# Patient Record
(demographics unavailable — no encounter records)

---

## 2019-07-28 NOTE — RAD
Left knee 3 views.

 

HISTORY: Fall

 

3 views were taken of the left knee. There is not evidence of an acute 

fracture. There is no joint effusion. There is osteoarthritis. There is 

mild joint space narrowing medially. There is hypertrophic spurring in all

3 compartments.

 

IMPRESSION:

1. Osteoarthritis left knee.

2. No acute fracture.

 

Electronically signed by: Nolan Maguire MD (7/28/2019 7:45 AM) [facility]

## 2019-07-28 NOTE — RAD
Right ankle 3 views, right foot 3 views.

 

HISTORY: Fall

 

Right ankle

 

3 views were taken of the right ankle. There is mild soft tissue swelling.

There is no acute fracture.

 

Right foot

 

3 views were taken of the right foot. There is not evidence of an acute 

fracture or osseous abnormality.

 

IMPRESSION:

1. No acute fracture noted in the right foot.

2. No acute fracture noted in the right ankle.

 

Electronically signed by: Nolan Maguire MD (7/28/2019 8:01 AM) [facility]

## 2019-07-28 NOTE — RAD
EXAM: CT HEAD WITHOUT IV CONTRAST

 

CLINICAL HISTORY: Fall

 

COMPARISON: None.

 

TECHNIQUE:

Routine CT of the head without contrast. Soft tissues and bone windows 

were reviewed.

 

PQRS compliance statement - One or more of the following individualized 

dose reduction techniques were utilized for this study:

1.  Automated exposure control

2.  Adjustment of the mA and/or kV according to patient size

3.  Use of iterative reconstruction technique

 

FINDINGS:  

There is no evidence of hemorrhage, mass or extra-axial fluid collection.

 

Grey-white differentiation is maintained with no evidence of edema. 

 

There is no mass effect or shift of the intracranial structures.

 

The ventricles, basilar cisterns and cortical sulci are normal in size and

configuration for the patients stated age.

 

The cerebellum and brainstem are unremarkable.  

 

The calvarium demonstrates no evidence of fracture or focal lesion.

 

There is normal aeration of the visualized paranasal sinuses and mastoid 

air cells.

 

The visualized portions of the orbits are normal.

 

 

 

IMPRESSION:

No evidence for acute intracranial process

___________________________________

EXAM: CT CERVICAL SPINE WITHOUT IV CONTRAST

 

CLINICAL HISTORY: Fall

 

COMPARISON: None available.

 

TECHNIQUE: Helical CT of the cervical spine was performed. Axial, coronal 

and sagittal reformatted images were also performed.

 

PQRS compliance statement - One or more of the following individualized 

dose reduction techniques were utilized for this study:

1.  Automated exposure control

2.  Adjustment of the mA and/or kV according to patient size

3.  Use of iterative reconstruction technique

 

FINDINGS: 

 

Vertebral body heights are preserved. Disc heights are grossly preserved. 

No spondylolisthesis. Straightening of the normal cervical lordosis. No 

evidence for acute fracture. Mild atlantodental degenerative changes are 

seen.

 

IMPRESSION:

No evidence for acute fracture or subluxation.

 

Electronically signed by: Carson Durant MD (7/28/2019 6:27 AM) [facility]

## 2019-07-28 NOTE — RAD
Right ankle 3 views, right foot 3 views.

 

HISTORY: Fall

 

Right ankle

 

3 views were taken of the right ankle. There is mild soft tissue swelling.

There is no acute fracture.

 

Right foot

 

3 views were taken of the right foot. There is not evidence of an acute 

fracture or osseous abnormality.

 

IMPRESSION:

1. No acute fracture noted in the right foot.

2. No acute fracture noted in the right ankle.

 

Electronically signed by: Nolan Maguire MD (7/28/2019 8:01 AM) [facility]

## 2019-08-31 NOTE — PHYS DOC
Past Medical History


Past Medical History:  Diabetes-Type II


Additional Past Medical Histor:  DIABETIC RIGHT FOOT ULCER


 (ANGÉLICA PEREZ)


Past Surgical History:  Other


Additional Past Surgical Histo:  RIGHT FOOT


 (ANGÉLICA PEREZ)


Alcohol Use:  Occasionally


Drug Use:  None


 (ANGÉLICA PEREZ)





Adult General


Chief Complaint


Chief Complaint:  ABSCESS





HPI


HPI





Patient is a 40  year old [male] who presents with [abscess to left hip. Patient

 reports he has noticed this over the past 4 days. Reports today he had put some

 more warm water on it and had "gotten some pus out of it. Reports he is a 

diabetic, reports his blood sugar has been little bit high today. States he has 

not eaten very much that his blood sugar has been running high. Denies any 

fever, denies any other complaints. reports he has never had one of these 

before.  ]


 (ANGÉLICA PEREZ)





Review of Systems


Review of Systems





Constitutional: Denies fever or chills []








GI: Denies abdominal pain, nausea, vomiting, bloody stools or diarrhea []


: Denies dysuria or hematuria []


Musculoskeletal: Denies back pain or joint pain []


Integument: Denies rash or skin lesions other than firm lesion to left hip with 

some purulent drainage []


Neurologic: Denies headache, focal weakness or sensory changes []


Endocrine: Denies polyuria or polydipsia []





All other systems were reviewed and found to be within normal limits, except as 

documented in this note.


 (ANGÉLICA PEREZ)





Allergies


Allergies





Allergies








Coded Allergies Type Severity Reaction Last Updated Verified


 


  piperacillin Allergy Intermediate  7/28/19 Yes


 


  tazobactam Allergy Intermediate  4/14/18 Yes





 (CHALINO SOTELO DO)





Physical Exam


Physical Exam





Constitutional: Well developed, well nourished, no acute distress, non-toxic 

appearance. []





Abdomen: Bowel sounds normal, soft, no tenderness, no masses, no pulsatile 

masses. [] 


Skin: Warm, dry, no erythema, no rash. Left hip noted approximately 4 cm x 7 cm 

firm abscess to left hip, one small area with minimal purulent drainage, 

proximally 1/2 milliliter. No fluctuance noted, abscess noted firm. Area warm[] 


Back: No tenderness, no CVA tenderness. [] 


Extremities: No tenderness, no cyanosis, no clubbing, ROM intact, no edema. [] 


Neurologic: Alert and oriented X 3, normal motor function, normal sensory 

function, no focal deficits noted. []


Psychologic: Affect normal, judgement normal, mood normal. []


 (ANGÉLICA PEREZ)





Current Patient Data


Vital Signs





                                   Vital Signs








  Date Time  Temp Pulse Resp B/P (MAP) Pulse Ox O2 Delivery O2 Flow Rate FiO2


 


8/31/19 21:00 98.5 122 14 166/106 (126) 96 Room Air  





 98.5       





 (CHALINO SOTELO DO)





EKG


EKG


[]


 (ANGÉLICA PEREZ)





Radiology/Procedures


Radiology/Procedures


[]


 (ANGÉLICA PEREZ)





Course & Med Decision Making


Course & Med Decision Making


Pertinent Labs and Imaging studies reviewed. (See chart for details)





[]Discussed findings with patient, without ability to last lesion at this time 

as it is not ready for Lancing. Advised patient to monitor blood sugar closely 

over the next few days, advised patient to taken medics as prescribed. Follow-up

 with primary care provider as needed. Patient will no further questions or 

concerns in agreement with plan. Patient may continue to use warm compress on 

lesion as needed


 (ANGÉLICA PEREZ)





Dragon Disclaimer


Dragon Disclaimer


This electronic medical record was generated, in whole or in part, using a voice

 recognition dictation system.


 (ANGÉLICA PEREZ)





Departure


Departure


Impression:  


   Primary Impression:  


   Abscess


Disposition:  01 HOME, SELF-CARE


Condition:  GOOD


Referrals:  


UNKNOWN PCP NAME (PCP)


Patient Instructions:  Abscess





Additional Instructions:  


As we discussed, continue to take Tylenol as needed for discomfort. You may 

continue to use a warm compress on that area to see if it will come to ahead and

 drain some more. He may put a warm compress on for 5 minutes every hour to see 

if this helps. Take the antibiotic as prescribed for the entire course, did not 

start taking it even if the abscess goes away before you finish antibiotic.


Scripts


Clindamycin Hcl (CLINDAMYCIN HCL) 300 Mg Capsule


300 MG PO TID for 10 Days, #30 CAP


   Prov: ANGÉLICA PEREZ         8/31/19





Attending Signature


Attending Signature


I have reviewed the PA/NP's note and plan of care. I was available for 

consultation as needed during the patient's visit in the emergency department. I

 agree with the clinical impression, plan, and disposition.


 (CHALINO SOTELO DO)











ANGÉLICA PEREZ              Aug 31, 2019 21:26


CHALINO SOTELO DO              Sep 1, 2019 08:13

## 2019-09-24 NOTE — PHYS DOC
Past Medical History


Past Medical History:  Diabetes-Type II


Additional Past Medical Histor:  DIABETIC RIGHT FOOT ULCER


Past Surgical History:  Other


Additional Past Surgical Histo:  RIGHT FOOT


Alcohol Use:  Occasionally


Drug Use:  None





Adult General


Chief Complaint


Chief Complaint:  MEDICATION REFILL





HPI


HPI





Patient is a 40  year old male with history of diabetes presents to ED for part 

refill. Patient states he needs Dexcom G6 transmitter and . Reports no 

symptoms. States his primary is out of town and he cannot get the part from 

them. Requesting a prescription.





Review of Systems


Review of Systems





Constitutional: Denies fever or chills []


Eyes: Denies change in visual acuity, redness, or eye pain []


HENT: Denies nasal congestion or sore throat []


Respiratory: Denies cough or shortness of breath []


Cardiovascular: No additional information not addressed in HPI []


GI: Denies abdominal pain, nausea, vomiting, bloody stools or diarrhea []


: Denies dysuria or hematuria []


Musculoskeletal: Denies back pain or joint pain []


Integument: Denies rash or skin lesions []


Neurologic: Denies headache, focal weakness or sensory changes []


Endocrine: Denies polyuria or polydipsia []





All other systems were reviewed and found to be within normal limits, except as 

documented in this note.





Allergies


Allergies





Allergies








Coded Allergies Type Severity Reaction Last Updated Verified


 


  piperacillin Allergy Intermediate  7/28/19 Yes


 


  tazobactam Allergy Intermediate  4/14/18 Yes











Physical Exam


Physical Exam





Constitutional: Well developed, well nourished, no acute distress, non-toxic 

appearance. []


HENT: Normocephalic, atraumatic


Eyes: PERRLA, EOMI, conjunctiva normal, no discharge. [] 


Neck: Normal range of motion, no tenderness, supple, no stridor. [] 


Cardiovascular:Heart rate regular rhythm, no murmur []


Lungs & Thorax:  Bilateral breath sounds clear to auscultation []


Abdomen: Bowel sounds normal, soft, no tenderness, no masses, no pulsatile 

masses. [] 


Skin: Warm, dry, no erythema, no rash. [] 


Back: No tenderness, no CVA tenderness. [] 


Extremities: No tenderness, no cyanosis, no clubbing, ROM intact, no edema. [] 


Neurologic: Alert and oriented X 3, normal motor function, normal sensory 

function, no focal deficits noted. []


Psychologic: Affect normal, judgement normal, mood normal. []





EKG


EKG


[]





Radiology/Procedures


Radiology/Procedures


[]





Course & Med Decision Making


Course & Med Decision Making


Pertinent Labs and Imaging studies reviewed. (See chart for details)





[]Prescription for Dexcom 6 transmitter and  given. Discussed follow-up 

with PCP and reasons to return to the ED. Patient understands and agrees with 

plan.





Dragon Disclaimer


Dragon Disclaimer


This electronic medical record was generated, in whole or in part, using a voice

 recognition dictation system.





Departure


Departure


Impression:  


   Primary Impression:  


   Medication refill


Disposition:  01 HOME, SELF-CARE


Condition:  IMPROVED


Referrals:  


UNKNOWN PCP NAME (PCP)








DEA BRADSHAW MD


Patient Instructions:  Medication Refill, Emergency Department











ANGÉLICA PAULSON        Sep 24, 2019 21:49

## 2019-11-24 NOTE — PHYS DOC
Past Medical History


Past Medical History:  Diabetes-Type II, Hypertension


Additional Past Medical Histor:  DIABETIC RIGHT FOOT ULCER


 (ANGÉLICA PEREZ)


Past Surgical History:  Other


Additional Past Surgical Histo:  RIGHT FOOT, ankle, rotator cuff


 (ANGÉLICA PEREZ)


Alcohol Use:  Occasionally


Drug Use:  None


 (ANGÉLICA PEREZ)





Adult General


Chief Complaint


Chief Complaint:  Influenza





HPI


HPI





Patient is a 41  year old [male] who presents with [fever, body aches. states he

 started to feel ill 2 days with fever, body aches, and general malaise. Reports

 he had taken some DayQuil, and the next he felt better. Reports he had talked 

to a family member who told him that he needed to come in and get antibiotics. 

Patient reports he did not get his flu shot, and never does, as he had taken it 

one time and he had gotten sick. Patient states he feels a lot better right now,

 has not taken any DayQuil or NyQuil or the medication since yesterday. Reports 

he is a diabetic, has monitor his blood sugar, blood sugar 202 today, reports he

 had just recently just prior to this. Reports he normally keeps his blood sugar

 around 120-140]


 (ANGÉLICA PEREZ)





Review of Systems


Review of Systems





Constitutional: Reports fever, denies chills reports he had body aches 

yesterday, denies body aches today[]


Eyes: Denies change in visual acuity, redness, or eye pain []


HENT: Denies nasal congestion or sore throat []


Respiratory: Denies cough or shortness of breath []


Cardiovascular: No additional information not addressed in HPI []


GI: Denies abdominal pain, nausea, vomiting, bloody stools or diarrhea []


: Denies dysuria or hematuria []


Musculoskeletal: Denies back pain or joint pain []


Integument: Denies rash or skin lesions []


Neurologic: Denies headache, focal weakness or sensory changes []


Endocrine: Denies polyuria or polydipsia []





All other systems were reviewed and found to be within normal limits, except as 

documented in this note.


 (ANGÉLICA PEREZ)





Allergies


Allergies





Allergies








Coded Allergies Type Severity Reaction Last Updated Verified


 


  piperacillin Allergy Intermediate  7/28/19 Yes


 


  tazobactam Allergy Intermediate  4/14/18 Yes





 (SHILO NOLAN MD)





Physical Exam


Physical Exam





Constitutional: Well developed, well nourished, obese, no acute distress, non-

toxic appearance. []


HENT: Normocephalic, atraumatic, bilateral external ears normal, oropharynx 

moist, no oral exudates, nose normal. []





Neck: Normal range of motion, no tenderness, supple, no stridor. [] 


Cardiovascular:Heart rate regular rhythm, no murmur []


Lungs & Thorax:  Bilateral breath sounds clear to auscultation no coughing 

noted]


Abdomen: Bowel sounds normal, soft, no tenderness, no masses, no pulsatile 

masses. [] 


Skin: Warm, dry, no erythema, no rash. [] 


Back: No tenderness, no CVA tenderness. [] 


Extremities: No tenderness, no cyanosis, no clubbing, ROM intact, no edema. [] 


Neurologic: Alert and oriented X 3, normal motor function, normal sensory 

function, no focal deficits noted. []


Psychologic: Affect normal, judgement normal, mood normal. []


 (ANGÉLICA PEREZ)





Current Patient Data


Vital Signs





                                   Vital Signs








  Date Time  Temp Pulse Resp B/P (MAP) Pulse Ox O2 Delivery O2 Flow Rate FiO2


 


11/24/19 19:33 100.0 107 16 173/99 (123) 97 Room Air  





 100.0       








 (SHILO NOLAN MD)


Lab Values





                                Laboratory Tests








Test


 11/24/19


20:21


 


Influenza Type A Antigen


 Negative


(NEGATIVE)


 


Influenza Type B Antigen


 Negative


(NEGATIVE)





 (SHILO NOLAN MD)


Lab Values





                                Laboratory Tests








Test


 11/24/19


20:21


 


Influenza Type A Antigen


 Negative


(NEGATIVE)


 


Influenza Type B Antigen


 Negative


(NEGATIVE)








 (ANGÉLICA PEREZ)





EKG


EKG


[]


 (ANGÉLICA PEREZ)





Radiology/Procedures


Radiology/Procedures


[]


 (ANGÉLICA PEREZ)





Course & Med Decision Making


Course & Med Decision Making


Pertinent Labs and Imaging studies reviewed. (See chart for details)





[Discussed flulike symptoms with patient, not requiring antibiotics, especially 

noted he feels better, discussed running a flu swab today, reports that he would

 like that done to make sure he does not have influenza. Discussed all that he 

has had symptoms for 48 hours he would not qualify for medications to treat 

influenza, but he still would like to know. We'll evaluate influenza here.]


 (ANGÉLICA PEREZ)





Dragon Disclaimer


Dragon Disclaimer


This electronic medical record was generated, in whole or in part, using a voice

recognition dictation system.


 (ANGÉLICA PEREZ)





Departure


Departure


Impression:  


   Primary Impression:  


   Upper respiratory infection


Disposition:  01 HOME, SELF-CARE


Condition:  STABLE


Referrals:  


UNKNOWN PCP NAME (PCP)


Patient Instructions:  Fever, Adult





Additional Instructions:  


As we discussed, make sure you're drinking plenty of fluids. He may take Tylenol

for your fevers. Your influenza today was negative. There was no findings today 

suggesting that you would need an antibiotic or a Z-Francois.  He can take some cough

and cold medication over-the-counter a few continue to have body aches, this 

will also have Tylenol in it which will help your fever and discomfort.


Scripts


D-Methorphan Hb/Prometh Hcl (PROMETHAZINE-DM SYRUP) 118 Ml Syrup


5 ML PO PRN Q4HRS PRN for cough for 4 Days, #120 ML 0 Refills


   Prov: ANGÉLICA PEREZ         11/24/19





Problem Qualifiers








   Primary Impression:  


   Upper respiratory infection


   URI type:  acute nasopharyngitis (common cold)  Qualified Codes:  J00 - Acute

   nasopharyngitis [common cold]








ANGÉLICA PEREZ              Nov 24, 2019 20:14


SHILO NOLAN MD            Nov 24, 2019 21:23

## 2019-12-15 NOTE — PHYS DOC
Past Medical History


Past Medical History:  Diabetes-Type II, Hypertension


Additional Past Medical Histor:  DIABETIC RIGHT FOOT ULCER


Past Surgical History:  Other


Additional Past Surgical Histo:  RIGHT FOOT, ankle, rotator cuff


Alcohol Use:  Occasionally


Drug Use:  None





Adult General


Chief Complaint


Chief Complaint:  LOWER EXTREMITY SWELLING





[facility]


HPI





41-year-old male presents to the emergency department with complaints of right 

lower extremity swelling. Patient states he's noticed increasing swelling over 

the last week. He denies any fever, nausea, vomiting, diarrhea, shortness of 

breath. Patient does state his leg is tender, warm to touch.  Pain with 

ambulation. Patient's does have underlying history of diabetes, hypertension.





Review of Systems


Review of Systems





Constitutional: Denies fever or chills []


Eyes: Denies change in visual acuity, redness, or eye pain []


HENT: Denies nasal congestion or sore throat []


Respiratory: Denies cough or shortness of breath []


Cardiovascular: No additional information not addressed in HPI []


GI: Denies abdominal pain, nausea, vomiting, bloody stools or diarrhea []


Musculoskeletal: Denies back pain or joint pain []


Integument: erythema to right lower ext


Neurologic: Denies headache, focal weakness or sensory changes []





All other systems were reviewed and found to be within normal limits, except as 

documented in this note.





Allergies


Allergies





Allergies








Coded Allergies Type Severity Reaction Last Updated Verified


 


  piperacillin Allergy Intermediate  7/28/19 Yes


 


  tazobactam Allergy Intermediate  4/14/18 Yes











Physical Exam


Physical Exam





Constitutional: Well developed, well nourished, no acute distress, non-toxic 

appearance. []


HENT: Normocephalic, atraumatic, bilateral external ears normal, oropharynx 

moist, no oral exudates, nose normal. []


Eyes: PERRLA, EOMI, conjunctiva normal, no discharge. [] 


Cardiovascular:Heart rate regular rhythm, no murmur []


Lungs & Thorax:  Bilateral breath sounds clear to auscultation []


Abdomen: Bowel sounds normal, soft, no tenderness, no masses, no pulsatile 

masses. [] 


Skin: Warm, dry, no erythema, no rash. [] 


Back: No tenderness, no CVA tenderness. [] 


Extremities: No tenderness, swelling/erythema to RLE [] 


Neurologic: Alert and oriented X 3, no focal deficits noted. []


Psychologic: Affect normal, judgement normal, mood normal. []





Current Patient Data


Vital Signs





                                   Vital Signs








  Date Time  Temp Pulse Resp B/P (MAP) Pulse Ox O2 Delivery O2 Flow Rate FiO2


 


12/15/19 02:10 97.3 101 16 170/91 (117) 98 Room Air  





 97.3       








Lab Values





                                Laboratory Tests








Test


 12/15/19


02:40


 


White Blood Count


 11.6 x10^3/uL


(4.0-11.0)  H


 


Red Blood Count


 5.29 x10^6/uL


(4.30-5.70)


 


Hemoglobin


 14.0 g/dL


(13.0-17.5)


 


Hematocrit


 43.3 %


(39.0-53.0)


 


Mean Corpuscular Volume


 82 fL ()





 


Mean Corpuscular Hemoglobin 27 pg (25-35)  


 


Mean Corpuscular Hemoglobin


Concent 32 g/dL


(31-37)


 


Red Cell Distribution Width


 14.6 %


(11.5-14.5)  H


 


Platelet Count


 244 x10^3/uL


(140-400)


 


Neutrophils (%) (Auto) 73 % (31-73)  


 


Lymphocytes (%) (Auto) 16 % (24-48)  L


 


Monocytes (%) (Auto) 9 % (0-9)  


 


Eosinophils (%) (Auto) 1 % (0-3)  


 


Basophils (%) (Auto) 1 % (0-3)  


 


Neutrophils # (Auto)


 8.4 x10^3/uL


(1.8-7.7)  H


 


Lymphocytes # (Auto)


 1.9 x10^3/uL


(1.0-4.8)


 


Monocytes # (Auto)


 1.0 x10^3/uL


(0.0-1.1)


 


Eosinophils # (Auto)


 0.1 x10^3/uL


(0.0-0.7)


 


Basophils # (Auto)


 0.1 x10^3/uL


(0.0-0.2)


 


D-Dimer (Radha)


 0.48 ug/mlFEU


(0.00-0.50)


 


Sodium Level


 138 mmol/L


(136-145)


 


Potassium Level


 4.3 mmol/L


(3.5-5.1)


 


Chloride Level


 103 mmol/L


()


 


Carbon Dioxide Level


 26 mmol/L


(21-32)


 


Anion Gap 9 (6-14)  


 


Blood Urea Nitrogen


 21 mg/dL


(8-26)


 


Creatinine


 0.9 mg/dL


(0.7-1.3)


 


Estimated GFR


(Cockcroft-Gault) 112.5  





 


BUN/Creatinine Ratio 23 (6-20)  H


 


Glucose Level


 217 mg/dL


(70-99)  H


 


Calcium Level


 8.8 mg/dL


(8.5-10.1)


 


Total Bilirubin


 0.5 mg/dL


(0.2-1.0)


 


Aspartate Amino Transferase


(AST) 23 U/L (15-37)





 


Alanine Aminotransferase (ALT)


 28 U/L (16-63)





 


Alkaline Phosphatase


 140 U/L


()  H


 


Total Protein


 7.8 g/dL


(6.4-8.2)


 


Albumin


 3.1 g/dL


(3.4-5.0)  L


 


Albumin/Globulin Ratio


 0.7 (1.0-1.7)


L





                                Laboratory Tests


12/15/19 02:40








                                Laboratory Tests


12/15/19 02:40











EKG


EKG


[]





Radiology/Procedures


Radiology/Procedures


[]





Course & Med Decision Making


Course & Med Decision Making


Pertinent Labs and Imaging studies reviewed. (See chart for details)





[]41-year-old male presents to the emergency department with complaints of right

 lower extremity swelling. Patient states he's noticed increasing swelling over 

the last week. He denies any fever, nausea, vomiting, diarrhea, shortness of 

breath. Patient does state his leg is tender, warm to touch.  Pain with 

ambulation. Patient's does have underlying history of diabetes, hypertension.





Labs reviewed, ddimer normal


Plan po abx treatment for cellulitis


Discussed findings with patient at bedside


Recommend dc home and follow up with PCP as needed


Return precautions provided


Tylenol/Motrin as needed





Dragon Disclaimer


Dragon Disclaimer


This electronic medical record was generated, in whole or in part, using a voice

 recognition dictation system.





Departure


Departure


Impression:  


   Primary Impression:  


   Cellulitis


Disposition:  01 HOME, SELF-CARE


Condition:  STABLE


Referrals:  


UNKNOWN PCP NAME (PCP)


Patient Instructions:  Cellulitis, Easy-to-Read





Additional Instructions:  


Recommend follow up with PCP 3 - 5 days


Return to the ER with worsening symptoms, intractable pain, fever, altered 

mental status


Tylenol/Motrin as needed for pain


Take antibiotics as provided


Scripts


Sulfamethoxazole/Trimethoprim (BACTRIM DS TABLET) 1 Each Tablet


1 TAB PO BID for infection for 5 Days, #10 TAB


   Prov: SUBHA NEWMAN MD         12/15/19





Problem Qualifiers








   Primary Impression:  


   Cellulitis


   Site of cellulitis:  extremity  Site of cellulitis of extremity:  lower 

   extremity  Laterality:  right  Qualified Codes:  L03.115 - Cellulitis of 

   right lower limb








SUBHA NEWMAN MD              Dec 15, 2019 02:42

## 2020-03-15 NOTE — PHYS DOC
Past Medical History


Past Medical History:  Diabetes-Type II, Hypertension


Additional Past Medical Histor:  DIABETIC RIGHT FOOT ULCER


Past Surgical History:  Other


Additional Past Surgical Histo:  RIGHT FOOT, ankle, rotator cuff


Smoking Status:  Never Smoker


Alcohol Use:  Occasionally


Drug Use:  None





Adult General


Chief Complaint


Chief Complaint:  Congestion





HPI


HPI





41-year-old male with underlying history of hypertension, diabetes presents 

emergency department complaints of cough x2 weeks.  Patient states he has had 

progressive cough, however states he has been afebrile.  Patient is a principal 

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 

exposure.  Attempted to educate the patient that cough x2 weeks without fever is

likely not associated with influenza nor that of coronavirus.  However patient 

states that I was talking down to him became aggressive.  Patient denies any 

nausea, vomiting, body aches.  Patient states he stopped the amlodipine 2/2 

lower extremity swelling and this has resolved.  Nothing makes his symptoms 

worse or better.





Review of Systems


Review of Systems





Constitutional: Denies fever or chills []


Respiratory: Cough/Congestion/SOB (x 2 weeks)


Cardiovascular: No additional information not addressed in HPI []


GI: Denies abdominal pain, nausea, vomiting, bloody stools or diarrhea []


Neurologic: Denies headache, focal weakness or sensory changes []





All other systems were reviewed and found to be within normal limits, except as 

documented in this note.





Allergies


Allergies





Allergies








Coded Allergies Type Severity Reaction Last Updated Verified


 


  piperacillin Allergy Intermediate  19 Yes


 


  tazobactam Allergy Intermediate  18 Yes











Physical Exam


Physical Exam





Constitutional: Well developed, well nourished, states he is SOB, non-toxic 

appearance. []


Cardiac:Tachycardia


Lungs & Thorax:  Decreased BS, Coarse - mucus


Skin: Warm, dry, no erythema, no rash. [] 


Extremities: Chronic lower ext edema


Neurologic: Alert and oriented X 3, no focal deficits noted. []


Psychologic: Affect normal, judgement normal, mood normal. []





Current Patient Data


Lab Values





                                Laboratory Tests








Test


 3/15/20


21:50


 


Influenza Type A Antigen


 Negative


(NEGATIVE)


 


Influenza Type B Antigen


 Negative


(NEGATIVE)











EKG


EKG


[]





Radiology/Procedures


Radiology/Procedures


[][facility]


                    [address]  [place], [place] [id number] [phone]


                                        


                                 IMAGING REPORT





                                     Signed





PATIENT: LANGEVERITO CORDERO    ACCOUNT: [id number]     MRN#: [id number]


: 1978           LOCATION: ER              AGE: 41


SEX: M                    EXAM DT: 03/15/20         ACCESSION#: [id number]


STATUS: REG ER            ORD. PHYSICIAN: SUBHA NEWMAN MD


REASON: COugh


PROCEDURE: CHEST AP ONLY





CHEST AP ONLY


 


Clinical History: Cough


 


Technique:  AP view of the chest was obtained at 3/15/2020 9:21 PM.


 


Comparison: 2019.


 


Findings:


The cardiomediastinal silhouette is normal. The pulmonary vasculature is 


normal. The lungs and pleural margins are clear.


 


Impression:  


 


No evidence of an acute cardiopulmonary process.


 


Electronically signed by: Irineo Van III, MD (3/15/2020 10:31 PM) 


[id number]














DICTATED and SIGNED BY:     IRINEO VAN III, MD


DATE:     03/15/20 2231





Course & Med Decision Making


Course & Med Decision Making


Pertinent Labs and Imaging studies reviewed. (See chart for details)





[]41-year-old male with underlying history of hypertension, diabetes presents 

emergency department complaints of cough x2 weeks.  Patient states he has had 

progressive cough, however states he has been afebrile.  Patient is a principal 

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 

exposure.  Attempted to educate the patient that cough x2 weeks without fever is

 likely not associated with influenza nor that of coronavirus.  However patient 

states that I was talking down to him became aggressive.  Patient denies any 

nausea, vomiting, body aches.  .  Patient states he stopped the amlodipine 2/2 

lower extremity swelling and this has subsequently resolved





CXR/Influenza reviewed both negative


Discussed findings with patient at bedside regarding tests


Discussed HCTZ addition given patient took himself off of amlodipine


Recommend dc home 


Follow up with PCP as needed 


Abx for continued congestion x 2 weeks, nasonex, continue mucinex





Dragon Disclaimer


Dragon Disclaimer


This electronic medical record was generated, in whole or in part, using a voice

 recognition dictation system.





Departure


Departure


Impression:  


   Primary Impression:  


   Congestion of upper respiratory tract


   Additional Impression:  


   Hypertension


Disposition:   HOME, SELF-CARE


Condition:  STABLE


Referrals:  


UNKNOWN PCP NAME (PCP)


Patient Instructions:  Hypertension, Upper Respiratory Infection, Adult, 

Easy-to-Read





Additional Instructions:  


Recommend follow up with PCP 


Call for appointment within 48 hours


Continue mucinex as you are doing


Scripts


Mometasone Furoate (NASONEX) 17 Gm Spray.pump


1 SPRAY NS DAILY, #1 INHALER 3 Refills


   Prov: SUBHA NEWMAN MD         3/15/20 


Azithromycin (AZITHROMYCIN TABLET) 500 Mg Tablet


1 TAB PO DAILY for 5 Days, #5 TAB 0 Refills


   Prov: SUBHA NEWMAN MD         3/15/20 


Hydrochlorothiazide (HYDROCHLOROTHIAZIDE TABLET) 12.5 Mg Tablet


12.5 MG PO DAILY for DIURETIC, #30 TAB 1 Refill


   Prov: SUBHA NEWMAN MD         3/15/20





Problem Qualifiers








   Additional Impression:  


   Hypertension


   Hypertension type:  essential hypertension  Qualified Codes:  I10 - Essential

    (primary) hypertension








SUBHA NEWMAN MD              Mar 15, 2020 21:29

## 2020-03-15 NOTE — RAD
CHEST AP ONLY

 

Clinical History: Cough

 

Technique:  AP view of the chest was obtained at 3/15/2020 9:21 PM.

 

Comparison: March 19, 2019.

 

Findings:

The cardiomediastinal silhouette is normal. The pulmonary vasculature is 

normal. The lungs and pleural margins are clear.

 

Impression:  

 

No evidence of an acute cardiopulmonary process.

 

Electronically signed by: Jaciel Carrion III, MD (3/15/2020 10:31 PM) 

[id number]